# Patient Record
(demographics unavailable — no encounter records)

---

## 2025-05-09 NOTE — HISTORY OF PRESENT ILLNESS
[FreeTextEntry1] : 50 year F  presents for annual visit. Concerned that fibroid is increasing in size. +heavy menses Has noticed bulging along right mons/inguinal region x yrs, non-tender, unsure if she has a hernia. States no improvement in menses after hysteroscopic myomectomy/polypectomy last year Requests pap screening Sexually active  Denies interval changes in Med/Surg/Family Hx.  LMP 4/25/2025   Health Maintenance   Pap - 4/25/2024 Mammo - 1/2025 Colonoscopy - 3/2025

## 2025-05-09 NOTE — COUNSELING
[Nutrition/ Exercise/ Weight Management] : nutrition, exercise, weight management [Vitamins/Supplements] : vitamins/supplements [Breast Self Exam] : breast self exam [FreeTextEntry2] : Stool softeners

## 2025-05-09 NOTE — REVIEW OF SYSTEMS
[Constipation] : constipation [Abn Vaginal bleeding] : abnormal vaginal bleeding [Negative] : Heme/Lymph [FreeTextEntry7] : Hemorrhoids [FreeTextEntry8] : Fibroids

## 2025-05-09 NOTE — PHYSICAL EXAM
[Chaperoned Physical Exam] : A chaperone was present in the examining room during all aspects of the physical examination. [MA] : MA [Appropriately responsive] : appropriately responsive [Alert] : alert [No Acute Distress] : no acute distress [No Lymphadenopathy] : no lymphadenopathy [Soft] : soft [Non-tender] : non-tender [Non-distended] : non-distended [No HSM] : No HSM [No Lesions] : no lesions [No Mass] : no mass [Oriented x3] : oriented x3 [Examination Of The Breasts] : a normal appearance [No Masses] : no breast masses were palpable [Labia Majora] : normal [Labia Minora] : normal [Normal] : normal [Enlarged ___ wks] : enlarged [unfilled] ~Uweeks [Uterine Adnexae] : non-palpable [FreeTextEntry1] : Non-tender bulging along right mons

## 2025-05-09 NOTE — PLAN
[FreeTextEntry1] : - Gen surgery info given for possible right inguinal hernia - Pt considering waiting for menopause as management of fibroids